# Patient Record
Sex: FEMALE | URBAN - METROPOLITAN AREA
[De-identification: names, ages, dates, MRNs, and addresses within clinical notes are randomized per-mention and may not be internally consistent; named-entity substitution may affect disease eponyms.]

---

## 2022-06-03 ENCOUNTER — OFFICE VISIT (OUTPATIENT)
Dept: URBAN - METROPOLITAN AREA CLINIC 23 | Facility: CLINIC | Age: 67
End: 2022-06-03
Payer: MEDICARE

## 2022-06-03 DIAGNOSIS — H52.4 PRESBYOPIA: Primary | ICD-10-CM

## 2022-06-03 DIAGNOSIS — H53.2 DIPLOPIA: ICD-10-CM

## 2022-06-03 PROCEDURE — 99203 OFFICE O/P NEW LOW 30 MIN: CPT | Performed by: OPTOMETRIST

## 2022-06-03 ASSESSMENT — KERATOMETRY
OS: 46.38
OD: 46.00

## 2022-06-03 ASSESSMENT — INTRAOCULAR PRESSURE
OS: 16
OD: 16

## 2022-06-03 ASSESSMENT — VISUAL ACUITY
OS: 20/30
OD: 20/30

## 2022-06-03 NOTE — IMPRESSION/PLAN
Impression: Diplopia: H53.2 Bilateral. Condition: mild. Plan: Fuses at 3 vertical and 4 base out. Added prism to glasses rx, no diplopia with trial frame.

## 2022-07-19 ENCOUNTER — OFFICE VISIT (OUTPATIENT)
Dept: URBAN - METROPOLITAN AREA CLINIC 16 | Facility: CLINIC | Age: 67
End: 2022-07-19

## 2022-07-19 DIAGNOSIS — H52.4 PRESBYOPIA: Primary | ICD-10-CM

## 2022-07-19 ASSESSMENT — INTRAOCULAR PRESSURE
OD: 16
OS: 16

## 2022-07-19 ASSESSMENT — VISUAL ACUITY
OS: 20/20
OD: 20/20

## 2022-07-19 ASSESSMENT — KERATOMETRY
OD: 46.00
OS: 47.25

## 2022-07-19 NOTE — IMPRESSION/PLAN
Impression: Presbyopia: H52.4. Plan: Refractive error accounts for symptoms. Release SRX. RTC prn x CFM.